# Patient Record
Sex: FEMALE | Race: WHITE
[De-identification: names, ages, dates, MRNs, and addresses within clinical notes are randomized per-mention and may not be internally consistent; named-entity substitution may affect disease eponyms.]

---

## 2019-08-17 ENCOUNTER — HOSPITAL ENCOUNTER (EMERGENCY)
Dept: HOSPITAL 53 - M ED | Age: 34
Discharge: HOME | End: 2019-08-17
Payer: MEDICARE

## 2019-08-17 VITALS — WEIGHT: 120.26 LBS | BODY MASS INDEX: 19.33 KG/M2 | HEIGHT: 66 IN

## 2019-08-17 VITALS — DIASTOLIC BLOOD PRESSURE: 65 MMHG | SYSTOLIC BLOOD PRESSURE: 123 MMHG
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
## 2019-08-17 DIAGNOSIS — T63.441A: ICD-10-CM

## 2019-08-17 DIAGNOSIS — T76.11XA: ICD-10-CM

## 2019-08-17 DIAGNOSIS — S80.811A: Primary | ICD-10-CM
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
## 2019-10-23 ENCOUNTER — HOSPITAL ENCOUNTER (OUTPATIENT)
Dept: HOSPITAL 53 - M OUTALCOH | Age: 34
End: 2019-10-23
Attending: PSYCHIATRY & NEUROLOGY
Payer: MEDICARE

## 2019-10-23 DIAGNOSIS — Z03.89: Primary | ICD-10-CM

## 2019-11-14 ENCOUNTER — HOSPITAL ENCOUNTER (OUTPATIENT)
Dept: HOSPITAL 53 - M OUTALCOH | Age: 34
LOS: 16 days | End: 2019-11-30
Attending: PSYCHIATRY & NEUROLOGY
Payer: MEDICARE

## 2019-11-14 DIAGNOSIS — Z03.89: Primary | ICD-10-CM

## 2020-01-10 ENCOUNTER — HOSPITAL ENCOUNTER (OUTPATIENT)
Dept: HOSPITAL 53 - M OUTALCOH | Age: 35
End: 2020-01-10
Attending: PSYCHIATRY & NEUROLOGY
Payer: MEDICARE

## 2020-01-10 DIAGNOSIS — Z03.89: Primary | ICD-10-CM

## 2020-01-20 ENCOUNTER — HOSPITAL ENCOUNTER (OUTPATIENT)
Dept: HOSPITAL 53 - M OUTALCOH | Age: 35
LOS: 11 days | End: 2020-01-31
Attending: PSYCHIATRY & NEUROLOGY
Payer: MEDICARE

## 2020-01-20 DIAGNOSIS — Z03.89: Primary | ICD-10-CM

## 2021-03-11 ENCOUNTER — HOSPITAL ENCOUNTER (OUTPATIENT)
Dept: HOSPITAL 53 - M SFHCLERA | Age: 36
End: 2021-03-11
Attending: NURSE PRACTITIONER
Payer: MEDICARE

## 2021-03-11 DIAGNOSIS — Z01.419: Primary | ICD-10-CM

## 2021-03-11 DIAGNOSIS — R87.622: ICD-10-CM

## 2021-04-14 ENCOUNTER — HOSPITAL ENCOUNTER (OUTPATIENT)
Dept: HOSPITAL 53 - M PLALAB | Age: 36
End: 2021-04-14
Attending: OBSTETRICS & GYNECOLOGY
Payer: MEDICARE

## 2021-04-14 DIAGNOSIS — N92.0: Primary | ICD-10-CM

## 2021-04-14 LAB
HCT VFR BLD AUTO: 44.3 % (ref 36–47)
HGB BLD-MCNC: 14.4 G/DL (ref 12–15.5)
MCH RBC QN AUTO: 34.4 PG (ref 27–33)
MCHC RBC AUTO-ENTMCNC: 32.5 G/DL (ref 32–36.5)
MCV RBC AUTO: 105.7 FL (ref 80–96)
PLATELET # BLD AUTO: 265 10^3/UL (ref 150–450)
RBC # BLD AUTO: 4.19 10^6/UL (ref 4–5.4)
T4 FREE SERPL-MCNC: 1.02 NG/DL (ref 0.76–1.46)
TSH SERPL DL<=0.005 MIU/L-ACNC: 1.31 UIU/ML (ref 0.36–3.74)
WBC # BLD AUTO: 9.4 10^3/UL (ref 4–10)

## 2021-04-14 PROCEDURE — 85027 COMPLETE CBC AUTOMATED: CPT

## 2021-04-14 PROCEDURE — 84439 ASSAY OF FREE THYROXINE: CPT

## 2021-04-14 PROCEDURE — 84443 ASSAY THYROID STIM HORMONE: CPT

## 2021-04-14 PROCEDURE — 36415 COLL VENOUS BLD VENIPUNCTURE: CPT

## 2021-04-15 ENCOUNTER — HOSPITAL ENCOUNTER (OUTPATIENT)
Dept: HOSPITAL 53 - M WHC | Age: 36
End: 2021-04-15
Attending: OBSTETRICS & GYNECOLOGY
Payer: MEDICARE

## 2021-04-15 DIAGNOSIS — N92.0: ICD-10-CM

## 2021-04-15 DIAGNOSIS — Z53.9: Primary | ICD-10-CM

## 2021-05-03 ENCOUNTER — HOSPITAL ENCOUNTER (OUTPATIENT)
Dept: HOSPITAL 53 - M SFHCWAGY | Age: 36
End: 2021-05-03
Attending: OBSTETRICS & GYNECOLOGY
Payer: MEDICARE

## 2021-05-03 DIAGNOSIS — R89.6: Primary | ICD-10-CM

## 2021-05-13 ENCOUNTER — HOSPITAL ENCOUNTER (OUTPATIENT)
Dept: HOSPITAL 53 - M WHC | Age: 36
End: 2021-05-13
Attending: OBSTETRICS & GYNECOLOGY
Payer: MEDICARE

## 2021-05-13 DIAGNOSIS — N92.0: Primary | ICD-10-CM

## 2021-05-13 NOTE — REP
INDICATION:

N92.0 MENORRHAGIA



COMPARISON:

None.



TECHNIQUE:

Transabdominal pelvic ultrasound with color Doppler evaluation of the ovaries.



FINDINGS:

Bladder is unremarkable and measures 9.3 x 8.7 x 9.7 cm.



Normal anteverted uterus measures 8.1 x 3.6 x 3.6 cm.  The endometrial complex

measures 3.0 mm thickness.  No discrete uterine or endometrial abnormalities are

appreciated.



Bilateral ovaries are normal in appearance and vascularity without evidence for

torsion.  Right ovary measures 3.4 x 1.4 x 1.6 cm; R I = 0.59.  Left ovary measures

2.0 x 2.1 x 2.4 cm; R I = 0.59.



No pelvic fluid or adnexal mass lesion.



IMPRESSION:

Normal transabdominal pelvic ultrasound.





<Electronically signed by Casa Tay > 05/13/21 [id number]

## 2021-08-12 ENCOUNTER — HOSPITAL ENCOUNTER (OUTPATIENT)
Dept: HOSPITAL 53 - M WHC | Age: 36
End: 2021-08-12
Attending: OBSTETRICS & GYNECOLOGY
Payer: MEDICARE

## 2021-08-12 DIAGNOSIS — N64.4: Primary | ICD-10-CM

## 2021-08-12 NOTE — REP
INDICATION:

BREAST PAIN RIGHT BREAST.



COMPARISON:

No comparison breast imaging..



TECHNIQUE:

Targeted right breast sonography of the area of pain, 8:00 to 10:00 scanning.



FINDINGS:

Heterogeneous fibroglandular background echotexture is seen from 8:00 to 10:00 in the

right breast.  No cyst, mass, acoustic shadowing or spiculation is seen.



IMPRESSION:

BI-RADS category 1 negative findings.  Clinical follow-up is advised.





<Electronically signed by Hector Quintero > 08/12/21 [id number]

## 2021-10-15 ENCOUNTER — HOSPITAL ENCOUNTER (OUTPATIENT)
Dept: HOSPITAL 53 - M LABSMTC | Age: 36
End: 2021-10-15
Attending: ANESTHESIOLOGY
Payer: MEDICARE

## 2021-10-15 DIAGNOSIS — Z20.822: ICD-10-CM

## 2021-10-15 DIAGNOSIS — Z01.812: Primary | ICD-10-CM

## 2021-10-20 ENCOUNTER — HOSPITAL ENCOUNTER (OUTPATIENT)
Dept: HOSPITAL 53 - M SDC | Age: 36
Discharge: HOME | End: 2021-10-20
Attending: OBSTETRICS & GYNECOLOGY
Payer: MEDICARE

## 2021-10-20 VITALS — HEIGHT: 66 IN | WEIGHT: 144 LBS | BODY MASS INDEX: 23.14 KG/M2

## 2021-10-20 DIAGNOSIS — J20.9: ICD-10-CM

## 2021-10-20 DIAGNOSIS — N93.9: Primary | ICD-10-CM

## 2021-10-20 DIAGNOSIS — Z53.09: ICD-10-CM

## 2021-11-01 ENCOUNTER — HOSPITAL ENCOUNTER (OUTPATIENT)
Dept: HOSPITAL 53 - M LABSMTC | Age: 36
End: 2021-11-01
Attending: ANESTHESIOLOGY
Payer: MEDICARE

## 2021-11-01 DIAGNOSIS — Z20.822: ICD-10-CM

## 2021-11-01 DIAGNOSIS — Z01.812: Primary | ICD-10-CM

## 2021-11-05 ENCOUNTER — HOSPITAL ENCOUNTER (OUTPATIENT)
Dept: HOSPITAL 53 - M SDC | Age: 36
LOS: 1 days | Discharge: HOME | End: 2021-11-06
Attending: OBSTETRICS & GYNECOLOGY
Payer: MEDICARE

## 2021-11-05 VITALS — DIASTOLIC BLOOD PRESSURE: 76 MMHG | SYSTOLIC BLOOD PRESSURE: 110 MMHG

## 2021-11-05 VITALS — DIASTOLIC BLOOD PRESSURE: 61 MMHG | SYSTOLIC BLOOD PRESSURE: 109 MMHG

## 2021-11-05 VITALS — BODY MASS INDEX: 22.79 KG/M2 | HEIGHT: 66 IN | WEIGHT: 141.8 LBS

## 2021-11-05 VITALS — SYSTOLIC BLOOD PRESSURE: 100 MMHG | DIASTOLIC BLOOD PRESSURE: 59 MMHG

## 2021-11-05 VITALS — DIASTOLIC BLOOD PRESSURE: 54 MMHG | SYSTOLIC BLOOD PRESSURE: 98 MMHG

## 2021-11-05 VITALS — SYSTOLIC BLOOD PRESSURE: 104 MMHG | DIASTOLIC BLOOD PRESSURE: 72 MMHG

## 2021-11-05 DIAGNOSIS — Z91.040: ICD-10-CM

## 2021-11-05 DIAGNOSIS — F17.218: ICD-10-CM

## 2021-11-05 DIAGNOSIS — F41.9: ICD-10-CM

## 2021-11-05 DIAGNOSIS — Z79.899: ICD-10-CM

## 2021-11-05 DIAGNOSIS — R10.2: ICD-10-CM

## 2021-11-05 DIAGNOSIS — Z88.8: ICD-10-CM

## 2021-11-05 DIAGNOSIS — F32.9: ICD-10-CM

## 2021-11-05 DIAGNOSIS — N93.9: Primary | ICD-10-CM

## 2021-11-05 DIAGNOSIS — R19.7: ICD-10-CM

## 2021-11-05 LAB
HCT VFR BLD AUTO: 46 % (ref 36–47)
HGB BLD-MCNC: 15.7 G/DL (ref 12–15.5)
MCH RBC QN AUTO: 38.2 PG (ref 27–33)
MCHC RBC AUTO-ENTMCNC: 34.1 G/DL (ref 32–36.5)
MCV RBC AUTO: 111.9 FL (ref 80–96)
PLATELET # BLD AUTO: 289 10^3/UL (ref 150–450)
RBC # BLD AUTO: 4.11 10^6/UL (ref 4–5.4)
WBC # BLD AUTO: 11.4 10^3/UL (ref 4–10)

## 2021-11-05 PROCEDURE — 84702 CHORIONIC GONADOTROPIN TEST: CPT

## 2021-11-05 PROCEDURE — 88307 TISSUE EXAM BY PATHOLOGIST: CPT

## 2021-11-05 PROCEDURE — 36415 COLL VENOUS BLD VENIPUNCTURE: CPT

## 2021-11-05 PROCEDURE — 86900 BLOOD TYPING SEROLOGIC ABO: CPT

## 2021-11-05 PROCEDURE — 96374 THER/PROPH/DIAG INJ IV PUSH: CPT

## 2021-11-05 PROCEDURE — 86850 RBC ANTIBODY SCREEN: CPT

## 2021-11-05 PROCEDURE — 85027 COMPLETE CBC AUTOMATED: CPT

## 2021-11-05 PROCEDURE — 86901 BLOOD TYPING SEROLOGIC RH(D): CPT

## 2021-11-05 PROCEDURE — 58571 TLH W/T/O 250 G OR LESS: CPT

## 2021-11-05 RX ADMIN — HYDROMORPHONE HYDROCHLORIDE PRN MG: 1 INJECTION, SOLUTION INTRAMUSCULAR; INTRAVENOUS; SUBCUTANEOUS at 18:27

## 2021-11-05 RX ADMIN — DOCUSATE SODIUM SCH MG: 100 CAPSULE, LIQUID FILLED ORAL at 21:21

## 2021-11-05 RX ADMIN — HYDROMORPHONE HYDROCHLORIDE PRN MG: 1 INJECTION, SOLUTION INTRAMUSCULAR; INTRAVENOUS; SUBCUTANEOUS at 17:56

## 2021-11-05 RX ADMIN — HYDROMORPHONE HYDROCHLORIDE PRN MG: 1 INJECTION, SOLUTION INTRAMUSCULAR; INTRAVENOUS; SUBCUTANEOUS at 18:03

## 2021-11-05 RX ADMIN — ESCITALOPRAM OXALATE SCH MG: 10 TABLET, FILM COATED ORAL at 20:19

## 2021-11-05 RX ADMIN — SODIUM CHLORIDE, POTASSIUM CHLORIDE, SODIUM LACTATE AND CALCIUM CHLORIDE SCH MLS/HR: 600; 310; 30; 20 INJECTION, SOLUTION INTRAVENOUS at 21:22

## 2021-11-05 NOTE — ROOPDOC
[facility] Report Of Operation


Report of Operation


DATE OF PROCEDURE: 11/5/2021





PREPROCEDURE DIAGNOSES: Abnormal uterine bleeding, chronic pelvic pain. 





POSTPROCEDURE DIAGNOSES: Same.





PROCEDURE: Robotic-assisted total laparoscopic hysterectomy, bilateral 

salpingectomy, cystoscopy





SURGEON: Robe Cristobal D.O. FACOG





ASSISTANT: Altagracia Dixon MD (crucial role with uterine manipulation)





ANESTHESIA: General endotracheal.





ESTIMATED BLOOD LOSS: Approximately 25 mL. 





FLUIDS REPLACED: 1600 mL LR





URINE OUTPUT: 100 mL





COMPLICATIONS: None. 





FINDINGS: Normal-appearing ovaries bilaterally.  Dense omental adhesions to the 

anterior abdominal wall.  Dense bladder adhesions to the anterior lower uterine 

segment/cervix.  Uterus was approximately 8 centimeters in greatest dimension.  

Cystoscopy: Bilateral ureteral orifice efflux, no bladder injury/suture 

material.





PREOPERATIVE ANTIBIOTIC PROPHYLAXIS: Ancef 2 g IV 1. 





SPECIMEN(S): Uterus w/ cervix, bilateral fallopian tubes





DESCRIPTION OF PROCEDURE:





The patient was counseled, consented on the respective benefits, indications, 

alternatives of procedure. Informed consent was obtained. She was taken to the 

operating room with an IV running. She was placed on the operating table in 

dorsal supine position. Gen. anesthesia was administered and the airway was 

secured without any difficulty. She was placed in the low lithotomy position.  .

She was prepared and draped in the normal sterile fashion. A time out was 

performed per protocol.  





A Celeste catheter was placed under sterile conditions. A sterile speculum was 

placed resulting in good visualization of the cervix.  A single-tooth tenaculum 

was used to grasp the anterior lip cervix.  The cervix was sequentially dilated 

with Jay Jay dilators. A V-Care uterine manipulator was placed without any 

difficulty. The single-tooth tenaculum was removed, as well as the speculum. A 

sterile glove switch was performed.  Attention was turned to the abdomen.








An 8mm incision was made in the left upper quadrant ([place]) and through

this incision a Veress needle was inserted into the intraperitoneal cavity. 

Intraperitoneal placement was confirmed with ease of flow of normal saline, 

positive drop test, no return on aspiration, and an opening pressure of less 

than 10 mmHg upon initial insufflation.  The abdomen was insufflated with 2 L of

gas.  The Veress needle was removed.  Through this incision, the robotic 

trochar/cannula was inserted into the intraperitoneal cavity under direct 

visualization.  No incidental bleeding nor injury was noted.  Patient was placed

in 30 Trendelenburg.  The right and left trocars/cannulas were placed on both 

the right and left side through 8 mm incisions, guided by laparoscopic 

visualization.  No incidental bleeding nor injury was noted.  The robot was 

docked in typical fashion.  The instruments were inserted by laparoscopic 

visualization/guidance.





My attention was turned to the robotic console.  Using the vessel sealer device,

the right and left fallopian tubes were amputated.  The fallopian tubes were 

brought through the assist-port cannula without any difficulty.  The right 

utero-ovarian ligament and right round ligament were sequentially clamped, 

coagulated and transected with the vessel sealer device.  The vesicouterine 

peritoneum was dissected, which required extensive adhesiolysis, with the vessel

sealer device to create the bladder flap, thus mobilizing the lower uterine 

segment and cervix off of the bladder. The right uterine vasculature was 

sequentially clamped, coagulated and transected above the colpotomy cup. The 

left utero-ovarian ligament and left round ligament were sequentially clamped, 

coagulated and transected with the vessel sealer device. The remainder of the 

bladder flap was dissected using the vessel sealer device and blunt dissection. 

The left uterine vasculature was sequentially clamped, coagulated and transected

above the colpotomy cup.  The outline of the entire V- care colpotomy cup was 

able to be delineated.  Excellent blanching of the uterus was noted.  A 

circumferential colpotomy was performed using the da Uriel monopolar aliza, 

following the contour of the cup.  The amputated cervix and uterus were brought 

through the colpotomy into and out of the vagina, intact as one unit.   The 

colpotomy was closed with the V-lock barbed suture in running fashion, thus 

creating the vaginal cuff.  Excellent hemostasis was noted throughout the steps 

above.  Zafar was placed over the vaginal cuff to ensure hemostasis.  The 

instruments were removed from the abdomen and the robot was un-docked.  The gas 

was released from the abdomen and the patient was taken out of Trendelenburg.





I re-scrubbed, and attention was turned to the pelvis.  The Celeste catheter was 

removed.  The cystoscope was placed transurethrally into the bladder and normal 

saline was instilled.  No bladder injury/suture material was noted.  IV 

methylene blue had been administered by anesthesia and bilateral UO efflux was 

confirmed.  The fluid was drained out of the bladder through the cystoscope 

device, then the cystoscope was removed.  The vagina was copiously irrigated.  A

sterile digital vaginal exam revealed no significant bleeding and an intact 

vaginal cuff.





A sterile glove switch was performed.  The da Uriel cannulas were removed.  The 

skin incisions were closed with 4-0 Monocryl in subcuticular fashion.  Sponge, 

needle and instrument counts were correct per protocol.  The patient tolerated 

the entire procedure very well.  She was transferred to the PACU in good and 

stable condition.





Robe Cristobal DO [facility] [place]











ROBE CRISTOBAL DO            Nov 5, 2021 18:05

## 2021-11-06 VITALS — DIASTOLIC BLOOD PRESSURE: 54 MMHG | SYSTOLIC BLOOD PRESSURE: 104 MMHG

## 2021-11-06 VITALS — SYSTOLIC BLOOD PRESSURE: 103 MMHG | DIASTOLIC BLOOD PRESSURE: 59 MMHG

## 2021-11-06 VITALS — DIASTOLIC BLOOD PRESSURE: 58 MMHG | SYSTOLIC BLOOD PRESSURE: 101 MMHG

## 2021-11-06 RX ADMIN — ESCITALOPRAM OXALATE SCH MG: 10 TABLET, FILM COATED ORAL at 08:34

## 2021-11-06 RX ADMIN — DOCUSATE SODIUM SCH MG: 100 CAPSULE, LIQUID FILLED ORAL at 08:33

## 2021-11-06 RX ADMIN — SODIUM CHLORIDE, POTASSIUM CHLORIDE, SODIUM LACTATE AND CALCIUM CHLORIDE SCH MLS/HR: 600; 310; 30; 20 INJECTION, SOLUTION INTRAVENOUS at 09:50

## 2021-11-06 RX ADMIN — SODIUM CHLORIDE, POTASSIUM CHLORIDE, SODIUM LACTATE AND CALCIUM CHLORIDE SCH MLS/HR: 600; 310; 30; 20 INJECTION, SOLUTION INTRAVENOUS at 01:50

## 2021-11-06 NOTE — IPNPDOC
Subjective


Date Seen


The patient was seen on 11/6/21.





Subjective


Chief Complaint/HPI


S/HPI: 


Patient is status post robotic assisted total laparoscopic hysterectomy, 

bilateral salpingectomy,  Postoperative day #1.





Patient is ambulating, tolerating p.o., voiding spontaneously, pain is under 

control, and she has had minimal vaginal bleeding.  She denies chest pain 

shortness of breath or subjective fever chills/myalgias.  Patient states she is 

ready to go home.





O:


Vitals are normal and stable, urine output within normal limits, afebrile


Abdomen soft nontender nondistended and incisions are clean dry and intact 

without any erythema or ecchymosis


Extremities nonedematous nontender





A/P: Postoperative day #1.  Meeting all discharge criteria.


-Routine postoperative instructions were reviewed


-Follow-up in 2 weeks as an outpatient or sooner as needed


-Discharge medications:  Percocet, Colace as needed.





ELLIOTT Dougherty DO





Assessment /Plan


Plan/VTE


VTE Prophylaxis Ordered?:  Yes


VTE Exclusion Mechanical Proph:  Low Risk for VTE





VS, I&O, 24H, Fishbone


Vital Signs/I&O





Vital Signs








  Date Time  Temp Pulse Resp B/P (MAP) Pulse Ox O2 Delivery O2 Flow Rate FiO2


 


11/6/21 09:05   17     


 


11/6/21 06:00 98.8 71  104/54 (71) 97 Room Air  


 


11/5/21 17:55       10.0 














I&O- Last 24 Hours up to 6 AM 


 


 11/6/21





 06:00


 


Intake Total 3550 ml


 


Output Total 775 ml


 


Balance 2775 ml











Laboratory Data


24H LABS


Laboratory Tests 2


11/5/21 14:03: 


Nucleated Red Blood Cells % (auto) 0.0, Human Chorionic Gonadotropin, Quant < 

1.0


CBC/BMP


Laboratory Tests


11/5/21 14:03

















ROBE DOUGHERTY DO            Nov 6, 2021 10:15

## 2022-05-05 ENCOUNTER — HOSPITAL ENCOUNTER (EMERGENCY)
Dept: HOSPITAL 53 - M ED | Age: 37
Discharge: HOME | End: 2022-05-05
Payer: MEDICARE

## 2022-05-05 VITALS — WEIGHT: 149.47 LBS | HEIGHT: 67 IN | BODY MASS INDEX: 23.46 KG/M2

## 2022-05-05 VITALS — OXYGEN SATURATION: 98 %

## 2022-05-05 VITALS — SYSTOLIC BLOOD PRESSURE: 112 MMHG | DIASTOLIC BLOOD PRESSURE: 72 MMHG

## 2022-05-05 DIAGNOSIS — U07.1: Primary | ICD-10-CM

## 2022-05-05 DIAGNOSIS — M79.10: ICD-10-CM

## 2022-05-05 DIAGNOSIS — R11.2: ICD-10-CM

## 2022-05-05 DIAGNOSIS — Z88.1: ICD-10-CM

## 2022-05-05 DIAGNOSIS — Z91.040: ICD-10-CM

## 2022-08-25 ENCOUNTER — HOSPITAL ENCOUNTER (EMERGENCY)
Dept: HOSPITAL 53 - M ED | Age: 37
Discharge: HOME | End: 2022-08-25
Payer: MEDICARE

## 2022-08-25 VITALS — BODY MASS INDEX: 22.68 KG/M2 | WEIGHT: 141.1 LBS | HEIGHT: 66 IN

## 2022-08-25 VITALS — SYSTOLIC BLOOD PRESSURE: 109 MMHG | DIASTOLIC BLOOD PRESSURE: 59 MMHG

## 2022-08-25 DIAGNOSIS — Z91.040: ICD-10-CM
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
## 2022-08-25 DIAGNOSIS — Z79.899: ICD-10-CM

## 2022-08-25 DIAGNOSIS — Z88.6: ICD-10-CM

## 2022-08-25 DIAGNOSIS — M25.572: Primary | ICD-10-CM

## 2022-09-26 ENCOUNTER — HOSPITAL ENCOUNTER (OUTPATIENT)
Dept: HOSPITAL 53 - M WUC | Age: 37
End: 2022-09-26
Attending: FAMILY MEDICINE
Payer: MEDICARE

## 2022-09-26 DIAGNOSIS — M25.50: ICD-10-CM

## 2022-09-26 DIAGNOSIS — M79.641: ICD-10-CM

## 2022-09-26 DIAGNOSIS — Z79.899: ICD-10-CM

## 2022-09-26 DIAGNOSIS — M79.604: Primary | ICD-10-CM

## 2022-09-26 DIAGNOSIS — M54.50: ICD-10-CM

## 2022-09-26 LAB
ALBUMIN SERPL BCG-MCNC: 4.1 GM/DL (ref 3.2–5.2)
ALT SERPL W P-5'-P-CCNC: 26 U/L (ref 12–78)
BASOPHILS # BLD AUTO: 0.1 10^3/UL (ref 0–0.2)
BASOPHILS NFR BLD AUTO: 0.4 % (ref 0–1)
BILIRUB SERPL-MCNC: 0.5 MG/DL (ref 0.2–1)
BUN SERPL-MCNC: 6 MG/DL (ref 7–18)
CALCIUM SERPL-MCNC: 9.3 MG/DL (ref 8.5–10.1)
CHLORIDE SERPL-SCNC: 103 MEQ/L (ref 98–107)
CO2 SERPL-SCNC: 25 MEQ/L (ref 21–32)
CREAT SERPL-MCNC: 0.87 MG/DL (ref 0.55–1.3)
CRP SERPL-MCNC: 0.3 MG/DL (ref 0–0.3)
EOSINOPHIL # BLD AUTO: 0.1 10^3/UL (ref 0–0.5)
EOSINOPHIL NFR BLD AUTO: 0.4 % (ref 0–3)
ERYTHROCYTE [SEDIMENTATION RATE] IN BLOOD BY WESTERGREN METHOD: 4 MM/HR (ref 0–20)
GFR SERPL CREATININE-BSD FRML MDRD: > 60 ML/MIN/{1.73_M2} (ref 60–?)
GLUCOSE SERPL-MCNC: 74 MG/DL (ref 70–100)
HCT VFR BLD AUTO: 45.7 % (ref 36–47)
HGB BLD-MCNC: 15.3 G/DL (ref 12–15.5)
LYMPHOCYTES # BLD AUTO: 2.5 10^3/UL (ref 1.5–5)
LYMPHOCYTES NFR BLD AUTO: 20.6 % (ref 24–44)
MCH RBC QN AUTO: 38 PG (ref 27–33)
MCHC RBC AUTO-ENTMCNC: 33.5 G/DL (ref 32–36.5)
MCV RBC AUTO: 113.4 FL (ref 80–96)
MONOCYTES # BLD AUTO: 0.6 10^3/UL (ref 0–0.8)
MONOCYTES NFR BLD AUTO: 4.9 % (ref 2–8)
NEUTROPHILS # BLD AUTO: 9.1 10^3/UL (ref 1.5–8.5)
NEUTROPHILS NFR BLD AUTO: 73.4 % (ref 36–66)
PLATELET # BLD AUTO: 259 10^3/UL (ref 150–450)
POTASSIUM SERPL-SCNC: 4.3 MEQ/L (ref 3.5–5.1)
PROT SERPL-MCNC: 7.6 GM/DL (ref 6.4–8.2)
RBC # BLD AUTO: 4.03 10^6/UL (ref 4–5.4)
RHEUMATOID FACT SERPL-ACNC: < 10 IU/ML (ref ?–15)
SODIUM SERPL-SCNC: 137 MEQ/L (ref 136–145)
TSH SERPL DL<=0.005 MIU/L-ACNC: 1.1 UIU/ML (ref 0.36–3.74)
URATE SERPL-MCNC: 3.7 MG/DL (ref 2.6–6)
WBC # BLD AUTO: 12.4 10^3/UL (ref 4–10)

## 2022-10-05 ENCOUNTER — HOSPITAL ENCOUNTER (OUTPATIENT)
Dept: HOSPITAL 53 - M WUC | Age: 37
End: 2022-10-05
Attending: FAMILY MEDICINE
Payer: MEDICARE

## 2022-10-05 DIAGNOSIS — R71.8: Primary | ICD-10-CM

## 2022-10-05 DIAGNOSIS — D72.829: ICD-10-CM

## 2022-10-05 LAB
BASOPHILS # BLD AUTO: 0 10^3/UL (ref 0–0.2)
BASOPHILS NFR BLD AUTO: 0.4 % (ref 0–1)
EOSINOPHIL # BLD AUTO: 0.1 10^3/UL (ref 0–0.5)
EOSINOPHIL NFR BLD AUTO: 0.7 % (ref 0–3)
FERRITIN SERPL-MCNC: 56 NG/ML (ref 8–252)
HCT VFR BLD AUTO: 42.7 % (ref 36–47)
HGB BLD-MCNC: 14.2 G/DL (ref 12–15.5)
IRON SATN MFR SERPL: 46.2 % (ref 13.2–45)
IRON SERPL-MCNC: 140 UG/DL (ref 50–170)
LYMPHOCYTES # BLD AUTO: 2.1 10^3/UL (ref 1.5–5)
LYMPHOCYTES NFR BLD AUTO: 21.1 % (ref 24–44)
MCH RBC QN AUTO: 37.8 PG (ref 27–33)
MCHC RBC AUTO-ENTMCNC: 33.3 G/DL (ref 32–36.5)
MCV RBC AUTO: 113.6 FL (ref 80–96)
MONOCYTES # BLD AUTO: 0.5 10^3/UL (ref 0–0.8)
MONOCYTES NFR BLD AUTO: 5 % (ref 2–8)
NEUTROPHILS # BLD AUTO: 7.2 10^3/UL (ref 1.5–8.5)
NEUTROPHILS NFR BLD AUTO: 72.5 % (ref 36–66)
PLATELET # BLD AUTO: 212 10^3/UL (ref 150–450)
RBC # BLD AUTO: 3.76 10^6/UL (ref 4–5.4)
TIBC SERPL-MCNC: 303 UG/DL (ref 250–450)
WBC # BLD AUTO: 10 10^3/UL (ref 4–10)

## 2022-10-07 LAB
FOLATE SERPL-MCNC: 6 NG/ML (ref 3–?)
VIT B12 SERPL-MCNC: 293 PG/ML (ref 247–911)